# Patient Record
(demographics unavailable — no encounter records)

---

## 2017-03-24 NOTE — EDM.PDOC
ED HPI GI/ABDOMINAL





- General


Chief Complaint: Abdominal Pain


Stated Complaint: RLQ pain


Time Seen by Provider: 03/23/17 22:40


Source of Information: Reports: Patient, Family


History Limitations: Reports: No limitations





- History of Present Illness


INITIAL COMMENTS - FREE TEXT/NARRATIVE: 





Patient had an onset of RLQ abdominal pain at 4pm (6 hrs ago).  Has been 

constant, 5/10, hurts to press the area.  No bowel or bladder issues.  No F/C/N/

V.  No significant PMHx


Timing/Duration: Reports: Hour(s): (6)


Location: RLQ


Quality: Reports: ache


Severity: moderate


Improves with: Reports: other (none)


Worsens with: Reports: other (none)


Associated Symptoms: Reports: denies other symptoms





- Related Data


Allergies/ADRs: 


 Allergies











Allergy/AdvReac Type Severity Reaction Status Date / Time


 


No Known Allergies Allergy   Verified 03/23/17 22:41











Home Meds: 


 Home Meds





. [No Known Home Meds]  03/23/17 [History]











Past Medical History





- Past Health History


Medical/Surgical History: Denies Medical/Surgical History


HEENT History: Reports: Impaired vision


Other HEENT History: wears glasses, history of broken nose, pt reports breath 

through mouth


Respiratory History: Reports: Sleep apnea, Other (see below)


Other Respiratory History: does not use CPAP machine





Social & Family History





- Tobacco Use


Smoking Status *Q: Never Smoker


Second Hand Smoke Exposure: No





- Caffeine Use


Caffeine Use: Reports: Soda





- Recreational Drug Use


Recreational Drug Use: No





ED ROS GENERAL





- Review of Systems


Review Of Systems: See Below


Constitutional: Reports: decreased appetite (since 4pm).  Denies: fever, chills

, malaise, weakness


HEENT: Reports: No symptoms


Respiratory: Reports: No Symptoms


Cardiovascular: Reports: No symptoms


Endocrine: Reports: no symptoms


GI/Abdominal: Reports: No symptoms


: Reports: no symptoms


Musculoskeletal: Reports: no symptoms


Skin: Reports: no symptoms


Neurological: Reports: No Symptoms


Psychiatric: Reports: No symptoms


Hematologic/Lymphatic: Reports: no symptoms


Immunologic: Reports: no symptoms





ED EXAM, GI/ABD





- Physical Exam


Exam: See Below


Exam Limited By: No limitations


General Appearance: alert, WD/WN, no apparent distress


Ears: normal external exam


Nose: normal inspection, normal mucosa, no blood


Throat/Mouth: Normal inspection, Normal oropharynx, Normal voice, No airway 

compromise


Head: atraumatic, normocephalic


Neck: normal inspection, supple, non-tender, full range of motion.  No: 

lymphadenopathy (L), lymphadenopathy (R)


Respiratory/Chest: no respiratory distress, lungs clear, normal breath sounds, 

no accessory muscle use


Cardiovascular: normal peripheral pulses, regular rate, rhythm, no edema, no 

murmur


GI/Abdominal: soft, hypoactive bowel sounds, tenderness (RLQ), Rovsing's sign.  

No: distention, guarding, rebound, psoas sign


Back Exam: normal inspection, full range of motion.  No: CVA tenderness (L), 

CVA tenderness (R)


Extremities: normal inspection, normal range of motion, non-tender, no pedal 

edema, normal capillary refill


Neurological: alert, oriented, CN II-XII intact, normal cognition, normal gait, 

no motor/sensory deficits


Psychiatric: normal affect, normal mood


Skin Exam: Warm, Dry, Intact, Normal color, No rash


Lymphatic: no adenopathy





Course





- Vital Signs


Last Recorded V/S: 


 Last Vital Signs











Temp  37.1 C   03/23/17 22:30


 


Pulse  81   03/23/17 23:50


 


Resp  18   03/23/17 23:30


 


BP  158/90 H  03/23/17 23:50


 


Pulse Ox  95   03/23/17 23:50














- Orders/Labs/Meds


Orders: 


 Active Orders 24 hr











 Category Date Time Status


 


 Abdomen Pelvis w Cont [CT] Stat Exams  03/23/17 23:01 Taken


 


 Piperacillin/Tazobactam [Zosyn] 3.375 gm Med  03/24/17 02:30 Ordered





 Sodium Chloride 0.9% [Normal Saline] 100 ml   





 IV Q6H   


 


 Sodium Chloride 0.9% [Normal Saline] 1,000 ml Med  03/24/17 00:15 Active





 IV ASDIRECTED   








 Medication Orders





Sodium Chloride (Normal Saline)  1,000 mls @ 35 mls/hr IV ASDIRECTED SANJAY


   Stop: 03/28/17 00:14


   Last Admin: 03/24/17 00:15  Dose: 35 mls/hr


Piperacillin Sod/Tazobactam (Sod 3.375 gm/ Sodium Chloride)  100 mls @ 200 mls/

hr IV Q6H [facility]








Labs: 


 Laboratory Tests











  03/23/17 03/23/17 03/23/17 Range/Units





  23:00 23:00 23:10 


 


WBC  14.2 H    (4.0-10.2)  K/uL


 


RBC  5.62 H    (4.33-5.41)  M/uL


 


Hgb  15.6    (13.1-16.8)  g/dL


 


Hct  45.9    (39.0-49.0)  %


 


MCV  81.7 L    (84.0-98.0)  fL


 


MCH  27.8 L    (28.2-33.3)  pg


 


MCHC  34.0    (31.7-36.0)  g/dL


 


RDW  13.7    (11.2-14.1)  %


 


Plt Count  180    (150-350)  K/uL


 


Neut % (Auto)  80.3 H    (45.0-80.0)  %


 


Lymph % (Auto)  8.7 L    (10.0-50.0)  %


 


Mono % (Auto)  10.5    (2.0-14.0)  %


 


Eos % (Auto)  0.1    (0.0-5.0)  %


 


Baso % (Auto)  0.4    (0.0-2.0)  %


 


Neut # (Auto)  11.37 H    (1.40-7.00)  K/uL


 


Lymph # (Auto)  1.23    (0.50-3.50)  K/uL


 


Mono # (Auto)  1.48 H    (0.00-1.00)  K/uL


 


Eos # (Auto)  0.01    (0.00-0.50)  K/uL


 


Baso # (Auto)  0.06    (0.00-0.20)  K/uL


 


Sodium   135 L   (136-145)  mmol/L


 


Potassium   4.4   (3.5-5.1)  mmol/L


 


Chloride   102   ()  mmol/L


 


Carbon Dioxide   24.3   (21.0-32.0)  mmol/L


 


BUN   17   (7-18)  mg/dL


 


Creatinine   0.90   (0.51-1.17)  mg/dL


 


Est Cr Clr Drug Dosing   91.25   mL/min


 


Estimated GFR (MDRD)   > 60   mL/min


 


Glucose   157 H   ()  mg/dL


 


Calcium   8.7   (8.5-10.1)  mg/dL


 


Total Bilirubin   1.1 H   (0.2-1.0)  mg/dL


 


AST   22   (15-37)  U/L


 


ALT   47   (12-78)  U/L


 


Alkaline Phosphatase   88   ()  IU/L


 


Total Protein   7.4   (6.4-8.2)  g/dL


 


Albumin   3.8   (3.4-5.0)  g/dL


 


Specimen Type    Urincc  


 


Urine Color    Yellow  


 


Urine Appearance    Clear  


 


Urine pH    6.0  (5.0-9.0)  


 


Ur Specific Gravity    1.025  (1.005-1.030)  


 


Urine Protein    Negative  (NEGATIVE)  mg/dL


 


Urine Glucose (UA)    Negative  (NEGATIVE)  mg/dL


 


Urine Ketones    Negative  (NEGATIVE)  mg/dL


 


Urine Occult Blood    Trace-intact H  (NEGATIVE)  


 


Urine Nitrite    Negative  (NEGATIVE)  


 


Urine Bilirubin    Negative  (NEGATIVE)  


 


Urine Urobilinogen    0.2  (0.2-1.0)  E.U./dL


 


Ur Leukocyte Esterase    Negative  (NEGATIVE)  


 


Urine RBC    0-5  /HPF


 


Urine WBC    0-5  /HPF


 


Ur Epithelial Cells    Few  /LPF


 


Urine Bacteria    Rare  (NONE TO FEW)  /HPF











Meds: 


Medications











Generic Name Dose Route Start Last Admin





  Trade Name Freq  PRN Reason Stop Dose Admin


 


Sodium Chloride  1,000 mls @ 35 mls/hr  03/24/17 00:15  03/24/17 00:15





  Normal Saline  IV  03/28/17 00:14  35 mls/hr





  ASDIRECTED SANJAY   Administration


 


Piperacillin Sod/Tazobactam  100 mls @ 200 mls/hr  03/24/17 02:30  





  Sod 3.375 gm/ Sodium Chloride  IV   





  Q6H SANJAY   














Discontinued Medications














Generic Name Dose Route Start Last Admin





  Trade Name Freq  PRN Reason Stop Dose Admin


 


Sodium Chloride  1,000 mls @ 999 mls/hr  03/23/17 22:49  03/23/17 23:01





  Normal Saline  IV  03/23/17 23:49  999 mls/hr





  .BOLUS ONE   Administration


 


Iopamidol  100 ml  03/23/17 23:34  





  Isovue-300 (61%)  IVPUSH  03/23/17 23:35  





  ONETIME ONE   


 


Iopamidol  Confirm  03/23/17 23:49  03/24/17 00:16





  Isovue-300 (61%)  Administered  03/23/17 23:50  100 ml





  Dose   Administration





  100 ml   





  .ROUTE   





  .STK-MED ONE   














Departure





- Departure


Time of Disposition: 02:21


Disposition: DC/[facility]fer to Acute Hospital [facility]


Condition: good


Clinical Impression: 


Appendicitis


Qualifiers:


 Appendicitis type: acute appendicitis Acute appendicitis type: with localized 

peritonitis Qualified Code(s): K35.3 - Acute appendicitis with localized 

peritonitis





Forms:  ED Department Discharge





- Problem List Review


Problem List Initiated/Reviewed/Updated: No





- My Orders


Last 24 Hours: 


My Active Orders





03/23/17 23:01


Abdomen Pelvis w Cont [CT] Stat 





03/24/17 00:15


Sodium Chloride 0.9% [Normal Saline] 1,000 ml IV ASDIRECTED 





03/24/17 02:30


Piperacillin/Tazobactam [Zosyn] 3.375 gm   Sodium Chloride 0.9% [Normal Saline] 

100 ml IV Q6H 














- Assessment/Plan


Last 24 Hours: 


My Active Orders





03/23/17 23:01


Abdomen Pelvis w Cont [CT] Stat 





03/24/17 00:15


Sodium Chloride 0.9% [Normal Saline] 1,000 ml IV ASDIRECTED 





03/24/17 02:30


Piperacillin/Tazobactam [Zosyn] 3.375 gm   Sodium Chloride 0.9% [Normal Saline] 

100 ml IV Q6H 











Assessment:: 





Acute appendicitis





Plan: 





1. Transfer to [facility] (Dr. Rodriguez)


2. Zosyn 3.375gr ivp